# Patient Record
Sex: FEMALE | Race: BLACK OR AFRICAN AMERICAN | URBAN - METROPOLITAN AREA
[De-identification: names, ages, dates, MRNs, and addresses within clinical notes are randomized per-mention and may not be internally consistent; named-entity substitution may affect disease eponyms.]

---

## 2020-02-26 LAB
PAP RECOMMENDATION EXT: NORMAL
PAP SMEAR: NORMAL

## 2020-10-06 ENCOUNTER — HISTORICAL (OUTPATIENT)
Dept: ADMINISTRATIVE | Facility: HOSPITAL | Age: 30
End: 2020-10-06

## 2020-10-06 LAB
ABS NEUT (OLG): 7.86 X10(3)/MCL (ref 2.1–9.2)
ALBUMIN SERPL-MCNC: 3.9 GM/DL (ref 3.4–5)
ALBUMIN/GLOB SERPL: 1 RATIO (ref 1.1–2)
ALP SERPL-CCNC: 68 UNIT/L (ref 45–117)
ALT SERPL-CCNC: 20 UNIT/L (ref 12–78)
APPEARANCE, UA: CLEAR
AST SERPL-CCNC: 13 UNIT/L (ref 15–37)
BACTERIA #/AREA URNS AUTO: ABNORMAL /HPF
BASOPHILS # BLD AUTO: 0 X10(3)/MCL (ref 0–0.2)
BASOPHILS NFR BLD AUTO: 0 %
BILIRUB SERPL-MCNC: 0.7 MG/DL (ref 0.2–1)
BILIRUB UR QL STRIP: NEGATIVE
BILIRUBIN DIRECT+TOT PNL SERPL-MCNC: 0.2 MG/DL (ref 0–0.2)
BILIRUBIN DIRECT+TOT PNL SERPL-MCNC: 0.5 MG/DL
BUN SERPL-MCNC: 15 MG/DL (ref 7–18)
CALCIUM SERPL-MCNC: 9.2 MG/DL (ref 8.5–10.1)
CHLORIDE SERPL-SCNC: 106 MMOL/L (ref 98–107)
CHOLEST SERPL-MCNC: 159 MG/DL
CHOLEST/HDLC SERPL: 2.8 {RATIO} (ref 0–4.4)
CO2 SERPL-SCNC: 29 MMOL/L (ref 21–32)
COLOR UR: YELLOW
CREAT SERPL-MCNC: 0.8 MG/DL (ref 0.6–1.3)
DEPRECATED CALCIDIOL+CALCIFEROL SERPL-MC: 20.8 NG/ML (ref 30–80)
EOSINOPHIL # BLD AUTO: 0 X10(3)/MCL (ref 0–0.9)
EOSINOPHIL NFR BLD AUTO: 0 %
ERYTHROCYTE [DISTWIDTH] IN BLOOD BY AUTOMATED COUNT: 14.6 % (ref 11.5–14.5)
EST. AVERAGE GLUCOSE BLD GHB EST-MCNC: 111 MG/DL
GLOBULIN SER-MCNC: 4.1 GM/ML (ref 2.3–3.5)
GLUCOSE (UA): NEGATIVE
GLUCOSE SERPL-MCNC: 82 MG/DL (ref 74–106)
HBA1C MFR BLD: 5.5 % (ref 4.2–6.3)
HCT VFR BLD AUTO: 37.1 % (ref 35–46)
HDLC SERPL-MCNC: 56 MG/DL (ref 40–59)
HGB BLD-MCNC: 11.4 GM/DL (ref 12–16)
HGB UR QL STRIP: 0.06 MG/DL
HYALINE CASTS #/AREA URNS LPF: ABNORMAL /LPF
IMM GRANULOCYTES # BLD AUTO: 0.03 10*3/UL
IMM GRANULOCYTES NFR BLD AUTO: 0 %
KETONES UR QL STRIP: ABNORMAL
LDLC SERPL CALC-MCNC: 90 MG/DL
LEUKOCYTE ESTERASE UR QL STRIP: 75 LEU/UL
LYMPHOCYTES # BLD AUTO: 2.4 X10(3)/MCL (ref 0.6–4.6)
LYMPHOCYTES NFR BLD AUTO: 22 %
MCH RBC QN AUTO: 25.7 PG (ref 26–34)
MCHC RBC AUTO-ENTMCNC: 30.7 GM/DL (ref 31–37)
MCV RBC AUTO: 83.7 FL (ref 80–100)
MONOCYTES # BLD AUTO: 0.8 X10(3)/MCL (ref 0.1–1.3)
MONOCYTES NFR BLD AUTO: 7 %
NEUTROPHILS # BLD AUTO: 7.86 X10(3)/MCL (ref 2.1–9.2)
NEUTROPHILS NFR BLD AUTO: 70 %
NITRITE UR QL STRIP: ABNORMAL
PH UR STRIP: 5.5 [PH] (ref 4.5–8)
PLATELET # BLD AUTO: 405 X10(3)/MCL (ref 130–400)
PMV BLD AUTO: 9.7 FL (ref 7.4–10.4)
POTASSIUM SERPL-SCNC: 4.3 MMOL/L (ref 3.5–5.1)
PROT SERPL-MCNC: 8 GM/DL (ref 6.4–8.2)
PROT UR QL STRIP: 20 MG/DL
RBC # BLD AUTO: 4.43 X10(6)/MCL (ref 4–5.2)
RBC #/AREA URNS AUTO: ABNORMAL /HPF
SODIUM SERPL-SCNC: 139 MMOL/L (ref 136–145)
SP GR UR STRIP: 1.02 (ref 1–1.03)
SQUAMOUS #/AREA URNS LPF: ABNORMAL /LPF
TRIGL SERPL-MCNC: 65 MG/DL
TSH SERPL-ACNC: 1.75 MIU/L (ref 0.36–3.74)
UROBILINOGEN UR STRIP-ACNC: NORMAL
VLDLC SERPL CALC-MCNC: 13 MG/DL
WBC # SPEC AUTO: 11.2 X10(3)/MCL (ref 4.5–11)
WBC #/AREA URNS AUTO: ABNORMAL /HPF

## 2020-10-27 ENCOUNTER — HISTORICAL (OUTPATIENT)
Dept: ADMINISTRATIVE | Facility: HOSPITAL | Age: 30
End: 2020-10-27

## 2020-10-27 LAB
APPEARANCE, UA: ABNORMAL
BACTERIA #/AREA URNS AUTO: ABNORMAL /HPF
BILIRUB UR QL STRIP: NEGATIVE
COLOR UR: YELLOW
GLUCOSE (UA): NEGATIVE
HGB UR QL STRIP: 0.03 MG/DL
HYALINE CASTS #/AREA URNS LPF: ABNORMAL /LPF
KETONES UR QL STRIP: NEGATIVE
LEUKOCYTE ESTERASE UR QL STRIP: 75 LEU/UL
NITRITE UR QL STRIP: NEGATIVE
PH UR STRIP: 6 [PH] (ref 4.5–8)
PROT UR QL STRIP: 30 MG/DL
RBC #/AREA URNS AUTO: ABNORMAL /HPF
SP GR UR STRIP: 1.02 (ref 1–1.03)
SQUAMOUS #/AREA URNS LPF: >100 /LPF
UROBILINOGEN UR STRIP-ACNC: NORMAL
WBC #/AREA URNS AUTO: ABNORMAL /HPF

## 2020-10-30 LAB — FINAL CULTURE: NORMAL

## 2022-04-10 ENCOUNTER — HISTORICAL (OUTPATIENT)
Dept: ADMINISTRATIVE | Facility: HOSPITAL | Age: 32
End: 2022-04-10
Payer: MEDICAID

## 2022-04-26 VITALS
DIASTOLIC BLOOD PRESSURE: 74 MMHG | OXYGEN SATURATION: 100 % | WEIGHT: 211.88 LBS | BODY MASS INDEX: 36.17 KG/M2 | SYSTOLIC BLOOD PRESSURE: 124 MMHG | HEIGHT: 64 IN

## 2022-05-03 NOTE — HISTORICAL [facility] CERNER
This is a historical note converted from [place]. Formatting and pictures may have been removed.  Please reference [place] for original formatting and attached multimedia. Chief Complaint  Dysuria  History of Present Illness  29-year-old female with a past medical history of hidradenitis, obesity presents to clinic for follow-up.  ?  Dysuria: Started x1 week. ?Noticed urine is dark in color.? Denied?strong odor.? Endorses increase in urgency and frequency.? Has had bladder infections in the past and this is what it feels like.? Denied fever, chills, nausea, vomiting. ?Does endorse?some lower back pain.? Last bladder infection greater than a year ago.? Tried AZO but reports breaking out in a rash.  ?  Headache:?Chronic issue, denied photophobia,?phonophobia.? Reports headaches occur mostly in the evening.? Denied blurry vision, weakness.? Headaches are relieved with?over-the-counter Tylenol or Motrin. ?Not interested in prescription medication?at this time.? Admits to not drinking a lot of water and wondering if this could be related.  ?  Obesity: Has not started any lifestyle modifications, however plans to do so soon. ?Requesting?routine lab work to make sure?she does not have thyroid problems or diabetes.? Denied family history of?both. ?Does endorse family history of?high blood pressure.  Review of Systems  Per HPI  Physical Exam  Vitals & Measurements  T:?37.3? ?C (Oral)? HR:?63(Peripheral)? RR:?18? BP:?119/85? SpO2:?100%?  HT:?163.00?cm? WT:?108.600?kg? BMI:?40.87? LMP:?09/21/2020 00:00 CDT?  General: Alert, well appearing, in no acute distress  Eye: PERRLA, EOMI, clear conjunctiva, No pallor  Neck: full range of motion, no thyromegaly or lymphadenopathy appreciated  Respiratory: clear to auscultation bilaterally, no wheezes, no crackles  Cardiovascular: regular rate and rhythm without murmurs, gallops or rubs  Gastrointestinal: soft, non-tender, non-distended with active bowel sounds  Genitourinary: no CVA  tenderness to palpation,  Musculoskeletal: Able to ambulate to exam table without difficulty, 2+DP,  Integumentary: no rashes or skin lesions present  Neurologic: no signs of peripheral neurological deficit, motor/sensory function intact, gait?cranial nerves II through XII grossly intact.  Psych: logical thought, good eye contact?  Assessment/Plan  Dysuria ? R30.0  ?Will empirically treat with Macrobid  UA, culture  Encouraged?increased?water intake  Discussed?hygiene  Fatigue ? R53.83  Encourage lifestyle modifications including healthy diet and exercise  We will get routine labs  Headache, unspecified ? R51.9  ?  Declined?headache medication  Declined additional work-up with imaging  Will contact clinic if symptoms?worsen or persist  Obesity ? E66.9  ?Encourage lifestyle modifications including healthy diet and exercise  Information?for [facility] given to patient  Orders:  nitrofurantoin, 100 mg = 1 cap(s), Oral, BID, X 7 day(s), # 14 cap(s), 0 Refill(s), Pharmacy: [id number], 163, cm, Height/Length Dosing, 10/06/20 12:45:00 CDT, 108.6, kg, Weight Dosing, 10/06/20 12:45:00 CDT  CBC w/ Auto Diff, Routine collect, 10/06/20 12:52:00 CDT, Blood, Order for future visit, Stop date 10/06/20 12:52:00 CDT, Lab Collect, Obesity  Headache, unspecified, 10/06/20 12:52:00 CDT  Comprehensive Metabolic Panel, Routine collect, 10/06/20 12:52:00 CDT, Blood, Order for future visit, Stop date 10/06/20 12:52:00 CDT, Lab Collect, Obesity  Headache, unspecified, 10/06/20 12:52:00 CDT  Hemoglobin A1C UHC, Routine collect, 10/06/20 12:52:00 CDT, Blood, Order for future visit, Stop date 10/06/20 12:52:00 CDT, Lab Collect, Obesity  Headache, unspecified, 10/06/20 12:52:00 CDT  Lipid Panel, Routine collect, 10/06/20 12:52:00 CDT, Blood, Order for future visit, Stop date 10/06/20 12:52:00 CDT, Lab Collect, Obesity  Headache, unspecified, 10/06/20 12:52:00 CDT  Office/Outpatient Visit Level 4 Established [id number] ,  Headache, unspecified  Obesity  Dysuria  Fatigue, 10/06/20 13:05:00 CDT  Thyroid Stimulating Hormone, Routine collect, 10/06/20 12:52:00 CDT, Blood, Order for future visit, Stop date 10/06/20 12:52:00 CDT, Lab Collect, Obesity  Headache, unspecified, 10/06/20 12:52:00 CDT  Urinalysis with Microscopic if Indicated, Routine collect, Urine, Order for future visit, 10/06/20 12:51:00 CDT, Stop date 10/06/20 12:51:00 CDT, Nurse collect, Dysuria  Urine Culture [id number], Routine collect, 10/06/20 12:51:00 CDT, Order for future visit, Urine, Nurse collect, Stop date 10/06/20 12:51:00 CDT, Dysuria  Vitamin D, 25-Hydroxy Level, Routine collect, 10/06/20 12:52:00 CDT, Blood, Order for future visit, Stop date 10/06/20 12:52:00 CDT, Lab Collect, Obesity  Headache, unspecified, 10/06/20 12:52:00 CDT   Problem List/Past Medical History  Ongoing  Abscess of axilla  Hidradenitis suppurativa  Obesity  Historical  Pregnant  Pregnant  Pregnant  Procedure/Surgical History  Delivery of Products of Conception, External Approach (03/22/2016)  Drainage of Amniotic Fluid, Therapeutic from Products of Conception, Via Natural or Artificial Opening (03/22/2016)  Introduction of Other Hormone into Peripheral Vein, Percutaneous Approach (03/22/2016)  denies   Medications  Macrobid 100 mg oral capsule, 100 mg= 1 cap(s), Oral, BID  Allergies  AZO Urinary Pain Relief?(Itching)  Social History  Abuse/Neglect  No, 02/26/2020  Alcohol - Denies Alcohol Use, 10/18/2012  Never, 09/08/2016  Employment/School  Employed, Work/School description: . Highest education level: High school., 09/12/2016  Exercise  Home/Environment  Lives with Children, Significant other. Living situation: Home/Independent. Alcohol abuse in household: No. Substance abuse in household: No. Smoker in household: No. Injuries/Abuse/Neglect in household: No. Feels unsafe at home: No. Safe place to go: Yes. Agency(s)/Others notified: No. Family/Friends available for support:  Yes. Concern for family members at home: No. Major illness in household: No. Financial concerns: No. TV/Computer concerns: No., 09/12/2016  Nutrition/Health  Regular, 09/12/2016  Sexual  Sexually active: Yes. Number of current partners 1. Sexual orientation: Straight or heterosexual. Gender Identity Identifies as female., 02/26/2020  Substance Use - Denies Substance Abuse, 10/18/2012  Never, 09/12/2016  Tobacco - Denies Tobacco Use, 10/18/2012  Never smoker, 09/08/2016  Family History  Hypertension.: Mother.  Primary malignant neoplasm of breast: Aunt and Other.  Immunizations  Vaccine Date Status Comments   tetanus/diphtheria/pertussis, acel(Tdap) - Not Given Patient Refuses     Health Maintenance  Health Maintenance  ???Pending?(in the next year)  ??? ??OverDue  ??? ? ? ?Diabetes Screening due??12/27/18??and every 3??year(s)  ??? ? ? ?Influenza Vaccine due??10/01/19??and every 1??day(s)  ??? ??Due?  ??? ? ? ?Tetanus Vaccine due??10/06/20??and every 10??year(s)  ??? ??Due In Future?  ??? ? ? ?Obesity Screening not due until??01/01/21??and every 1??year(s)  ??? ? ? ?Alcohol Misuse Screening not due until??01/02/21??and every 1??year(s)  ??? ? ? ?ADL Screening not due until??02/26/21??and every 1??year(s)  ???Satisfied?(in the past 1 year)  ??? ??Satisfied?  ??? ? ? ?ADL Screening on??02/26/20.??Satisfied by Mia Melendez  ??? ? ? ?Alcohol Misuse Screening on??10/06/20.??Satisfied by Mia Melendez  ??? ? ? ?Blood Pressure Screening on??10/06/20.??Satisfied by Mia Melendez  ??? ? ? ?Body Mass Index Check on??10/06/20.??Satisfied by Mia Melendez  ??? ? ? ?Cervical Cancer Screening on??02/26/20.??Satisfied by Steph Ordaz  ??? ? ? ?Depression Screening on??10/06/20.??Satisfied by Mia Melendez  ??? ? ? ?Influenza Vaccine on??10/06/20.??Satisfied by Mia Melendez  ??? ? ? ?Obesity Screening on??10/06/20.??Satisfied by Mia Melendez  ?      Overall labs are not  concerning  Patient does have a slightly elevated white blood count-does currently have?cystitis  Recommend repeating?at next visit  ?  Vitamin D low-recommend OTC vitamin D supplements

## 2023-01-26 ENCOUNTER — DOCUMENTATION ONLY (OUTPATIENT)
Dept: ADMINISTRATIVE | Facility: HOSPITAL | Age: 33
End: 2023-01-26
Payer: MEDICAID